# Patient Record
Sex: MALE | Race: OTHER | HISPANIC OR LATINO | URBAN - METROPOLITAN AREA
[De-identification: names, ages, dates, MRNs, and addresses within clinical notes are randomized per-mention and may not be internally consistent; named-entity substitution may affect disease eponyms.]

---

## 2020-08-31 ENCOUNTER — EMERGENCY (EMERGENCY)
Facility: HOSPITAL | Age: 35
LOS: 1 days | Discharge: HOME | End: 2020-08-31
Attending: EMERGENCY MEDICINE
Payer: COMMERCIAL

## 2020-08-31 VITALS
OXYGEN SATURATION: 99 % | SYSTOLIC BLOOD PRESSURE: 123 MMHG | RESPIRATION RATE: 18 BRPM | WEIGHT: 177.03 LBS | TEMPERATURE: 98 F | DIASTOLIC BLOOD PRESSURE: 78 MMHG | HEIGHT: 69 IN | HEART RATE: 59 BPM

## 2020-08-31 PROCEDURE — 99283 EMERGENCY DEPT VISIT LOW MDM: CPT

## 2020-08-31 PROCEDURE — 73030 X-RAY EXAM OF SHOULDER: CPT | Mod: 26,RT

## 2020-08-31 PROCEDURE — 73030 X-RAY EXAM OF SHOULDER: CPT

## 2020-08-31 PROCEDURE — 99284 EMERGENCY DEPT VISIT MOD MDM: CPT

## 2020-08-31 RX ORDER — IBUPROFEN 200 MG
600 TABLET ORAL ONCE
Refills: 0 | Status: COMPLETED | OUTPATIENT
Start: 2020-08-31 | End: 2020-08-31

## 2020-08-31 RX ORDER — METHOCARBAMOL 500 MG/1
2 TABLET, FILM COATED ORAL
Qty: 24 | Refills: 0
Start: 2020-08-31 | End: 2020-09-03

## 2020-08-31 RX ORDER — IBUPROFEN 200 MG
1 TABLET ORAL
Qty: 28 | Refills: 0
Start: 2020-08-31 | End: 2020-09-06

## 2020-08-31 RX ADMIN — Medication 600 MILLIGRAM(S): at 15:00

## 2020-08-31 NOTE — ED ADULT TRIAGE NOTE - CHIEF COMPLAINT QUOTE
pt presents to ed a&ox3, no acute distress c/o R shoulder pain s/p being [place].
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
## 2020-08-31 NOTE — ED PROVIDER NOTE - NSFOLLOWUPINSTRUCTIONS_ED_ALL_ED_FT
Follow up with orthopedics.  Take medication as prescribed.  Come back with new or worsening symptoms.

## 2020-08-31 NOTE — ED PROVIDER NOTE - PROGRESS NOTE DETAILS
I performed the initial face to face bedside interview with this patient regarding history of present illness, review of symptoms and past medical, social and family history.  I completed an independent physical examination.  I was the initial provider who evaluated this patient.  The history, review of symptoms and examination was documented by the scribe in my presence and I attest to the accuracy of the documentation.  I have signed out the follow up of any pending tests (i.e. labs, radiological studies) to the PA.  I have discussed the patient’s plan of care and disposition with the PA. Pt evaluated by attending. I agree with plan. Will follow up xray. xray negative. Will sling and dc with ortho f/u.

## 2020-08-31 NOTE — ED PROVIDER NOTE - MUSCULOSKELETAL MINIMAL EXAM
RANGE OF MOTION LIMITED/rt shoulder, normal adduction, but painful abduction and flexion after 60 degrees

## 2020-08-31 NOTE — ED PROVIDER NOTE - CARE PROVIDER_API CALL
Cari Carlos)  Orthopedics  [address] [place] [address], [address]  [place] [address]  Phone: [phone]  Fax: [phone]  Follow Up Time:

## 2020-08-31 NOTE — ED PROVIDER NOTE - CARE PROVIDERS DIRECT ADDRESSES
[email or web address]

## 2020-08-31 NOTE — ED PROVIDER NOTE - PATIENT PORTAL LINK FT
You can access the FollowMyHealth Patient Portal offered by [facility] by registering at the following website: [email or web address]. By joining "[employer]’s FollowMyHealth portal, you will also be able to view your health information using other applications (apps) compatible with our system.